# Patient Record
(demographics unavailable — no encounter records)

---

## 2024-12-25 NOTE — HISTORY OF PRESENT ILLNESS
[TextBox_4] : never smoker tooth extraction end of october, tired and weak and felt like hell heard"gurgles" put on doxycycline ( her primary doc and botox giver) went to urgent care and told of pneumonia she does not have those images she does have a ct scan that shows area of bronchiectasis, likely from the pneumonias has had pneumonia four times hyperactive. angelo on the right. feels tired still chest films have been fine. she claims. she repeatedly tells me she has no med problems, then tells me afib and is on eliquis and metoprolol

## 2024-12-25 NOTE — DISCUSSION/SUMMARY
[FreeTextEntry1] : this was an unusual interaction for several reasons the most being that she asks why does she get this every fall. and was not happy with the explanation that she is human i told her about bronchiectasis and its tendency to cause infections that are worse she was indeed likely overtreated with unnecessary antibiotics by primary

## 2024-12-25 NOTE — PHYSICAL EXAM
[No Acute Distress] : no acute distress [Normal Oropharynx] : normal oropharynx [Normal Appearance] : normal appearance [No Neck Mass] : no neck mass [Normal Rate/Rhythm] : normal rate/rhythm [Normal S1, S2] : normal s1, s2 [No Murmurs] : no murmurs [No Resp Distress] : no resp distress [No Abnormalities] : no abnormalities [Benign] : benign [Normal Gait] : normal gait [No Clubbing] : no clubbing [No Cyanosis] : no cyanosis [No Edema] : no edema [FROM] : FROM [Normal Color/ Pigmentation] : normal color/ pigmentation [No Focal Deficits] : no focal deficits [Oriented x3] : oriented x3 [Normal Affect] : normal affect [TextBox_68] : slight harshness of breath sound on right

## 2024-12-25 NOTE — PROCEDURE
[FreeTextEntry1] : spirometry is normal review of the ct scan  shows area of bronchiectgasis, nothing that looks acute